# Patient Record
Sex: FEMALE | Race: WHITE | NOT HISPANIC OR LATINO | URBAN - METROPOLITAN AREA
[De-identification: names, ages, dates, MRNs, and addresses within clinical notes are randomized per-mention and may not be internally consistent; named-entity substitution may affect disease eponyms.]

---

## 2017-01-10 ENCOUNTER — IMPORTED ENCOUNTER (OUTPATIENT)
Dept: URBAN - METROPOLITAN AREA CLINIC 59 | Facility: CLINIC | Age: 82
End: 2017-01-10

## 2017-01-10 PROBLEM — D31.31 DRY EYE SYNDROME OF BILATERAL LACRIMAL GLANDS: Noted: 2017-01-10

## 2017-01-10 PROBLEM — H40.1131 PRIMARY OPEN-ANGLE GLAUCOMA, BILATERAL, MILD STAGE: Noted: 2017-01-10

## 2017-01-10 PROBLEM — H04.121 TEAR FILM INSUFFICIENCY OF RT LACRIMAL GLAND: Noted: 2017-01-10

## 2017-01-10 PROBLEM — H04.122 TEAR FILM INSUFFICIENCY OF LT LACRIMAL GLAND: Noted: 2017-01-10

## 2017-01-10 PROCEDURE — 99204 OFFICE O/P NEW MOD 45 MIN: CPT

## 2017-01-10 PROCEDURE — 76514 ECHO EXAM OF EYE THICKNESS: CPT

## 2017-01-10 PROCEDURE — 92250 FUNDUS PHOTOGRAPHY W/I&R: CPT

## 2017-05-05 ENCOUNTER — IMPORTED ENCOUNTER (OUTPATIENT)
Dept: URBAN - METROPOLITAN AREA CLINIC 59 | Facility: CLINIC | Age: 82
End: 2017-05-05

## 2017-05-05 PROBLEM — H04.122 TEAR FILM INSUFFICIENCY OF LT LACRIMAL GLAND: Noted: 2017-05-05

## 2017-05-05 PROBLEM — D31.31 DRY EYE SYNDROME OF BILATERAL LACRIMAL GLANDS: Noted: 2017-05-05

## 2017-05-05 PROBLEM — H04.121 TEAR FILM INSUFFICIENCY OF RT LACRIMAL GLAND: Noted: 2017-05-05

## 2017-05-05 PROBLEM — H40.1131 PRIMARY OPEN-ANGLE GLAUCOMA, BILATERAL, MILD STAGE: Noted: 2017-05-05

## 2017-05-05 PROCEDURE — 99214 OFFICE O/P EST MOD 30 MIN: CPT

## 2017-05-05 PROCEDURE — 92083 EXTENDED VISUAL FIELD XM: CPT

## 2017-05-05 PROCEDURE — 92133 CPTRZD OPH DX IMG PST SGM ON: CPT

## 2017-09-07 ENCOUNTER — IMPORTED ENCOUNTER (OUTPATIENT)
Dept: URBAN - METROPOLITAN AREA CLINIC 59 | Facility: CLINIC | Age: 82
End: 2017-09-07

## 2017-09-07 PROBLEM — D31.31 DRY EYE SYNDROME OF BILATERAL LACRIMAL GLANDS: Noted: 2017-09-07

## 2017-09-07 PROBLEM — H04.121 TEAR FILM INSUFFICIENCY OF RT LACRIMAL GLAND: Noted: 2017-09-07

## 2017-09-07 PROBLEM — H40.1131 PRIMARY OPEN-ANGLE GLAUCOMA, BILATERAL, MILD STAGE: Noted: 2017-09-07

## 2017-09-07 PROBLEM — H04.122 TEAR FILM INSUFFICIENCY OF LT LACRIMAL GLAND: Noted: 2017-09-07

## 2017-09-07 PROCEDURE — 99213 OFFICE O/P EST LOW 20 MIN: CPT

## 2017-12-20 ENCOUNTER — APPOINTMENT (RX ONLY)
Dept: URBAN - METROPOLITAN AREA CLINIC 348 | Facility: CLINIC | Age: 82
Setting detail: DERMATOLOGY
End: 2017-12-20

## 2017-12-20 DIAGNOSIS — Z85.828 PERSONAL HISTORY OF OTHER MALIGNANT NEOPLASM OF SKIN: ICD-10-CM | Status: RESOLVED

## 2017-12-20 DIAGNOSIS — L82.1 OTHER SEBORRHEIC KERATOSIS: ICD-10-CM

## 2017-12-20 DIAGNOSIS — L81.4 OTHER MELANIN HYPERPIGMENTATION: ICD-10-CM

## 2017-12-20 DIAGNOSIS — D22 MELANOCYTIC NEVI: ICD-10-CM

## 2017-12-20 DIAGNOSIS — D18.0 HEMANGIOMA: ICD-10-CM

## 2017-12-20 PROBLEM — D18.01 HEMANGIOMA OF SKIN AND SUBCUTANEOUS TISSUE: Status: ACTIVE | Noted: 2017-12-20

## 2017-12-20 PROBLEM — D22.5 MELANOCYTIC NEVI OF TRUNK: Status: ACTIVE | Noted: 2017-12-20

## 2017-12-20 PROCEDURE — 99213 OFFICE O/P EST LOW 20 MIN: CPT

## 2017-12-20 PROCEDURE — ? OTHER

## 2017-12-20 PROCEDURE — ? COUNSELING

## 2017-12-20 ASSESSMENT — LOCATION DETAILED DESCRIPTION DERM
LOCATION DETAILED: RIGHT SUPERIOR LATERAL UPPER BACK
LOCATION DETAILED: PERIUMBILICAL SKIN
LOCATION DETAILED: LEFT POSTERIOR NECK
LOCATION DETAILED: LEFT MID-UPPER BACK
LOCATION DETAILED: RIGHT LATERAL SUPERIOR CHEST
LOCATION DETAILED: RIGHT INFERIOR LATERAL MIDBACK
LOCATION DETAILED: LEFT MEDIAL BREAST 8-9:00 REGION
LOCATION DETAILED: EPIGASTRIC SKIN
LOCATION DETAILED: RIGHT CENTRAL EYEBROW
LOCATION DETAILED: LEFT MEDIAL UPPER BACK

## 2017-12-20 ASSESSMENT — LOCATION SIMPLE DESCRIPTION DERM
LOCATION SIMPLE: RIGHT EYEBROW
LOCATION SIMPLE: LEFT BREAST
LOCATION SIMPLE: LEFT UPPER BACK
LOCATION SIMPLE: ABDOMEN
LOCATION SIMPLE: CHEST
LOCATION SIMPLE: POSTERIOR NECK
LOCATION SIMPLE: RIGHT LOWER BACK
LOCATION SIMPLE: RIGHT BACK

## 2017-12-20 ASSESSMENT — LOCATION ZONE DERM
LOCATION ZONE: NECK
LOCATION ZONE: FACE
LOCATION ZONE: TRUNK

## 2017-12-20 NOTE — PROCEDURE: OTHER
Other (Free Text): SCC Right Central Eyebrow Tx: Moh's 4/6/2010\\nSCC Left Posterior Neck Tx: ED&C 4/26/2011
Detail Level: Detailed
Note Text (......Xxx Chief Complaint.): This diagnosis correlates with the

## 2018-03-16 ENCOUNTER — IMPORTED ENCOUNTER (OUTPATIENT)
Dept: URBAN - METROPOLITAN AREA CLINIC 59 | Facility: CLINIC | Age: 83
End: 2018-03-16

## 2018-03-16 PROBLEM — H04.122 TEAR FILM INSUFFICIENCY OF LT LACRIMAL GLAND: Noted: 2018-03-16

## 2018-03-16 PROBLEM — D31.31 DRY EYE SYNDROME OF BILATERAL LACRIMAL GLANDS: Noted: 2018-03-16

## 2018-03-16 PROBLEM — H04.121 TEAR FILM INSUFFICIENCY OF RT LACRIMAL GLAND: Noted: 2018-03-16

## 2018-03-16 PROBLEM — H40.1131 PRIMARY OPEN-ANGLE GLAUCOMA, BILATERAL, MILD STAGE: Noted: 2018-03-16

## 2018-03-16 PROCEDURE — 92015 DETERMINE REFRACTIVE STATE: CPT

## 2018-03-16 PROCEDURE — 92083 EXTENDED VISUAL FIELD XM: CPT

## 2018-03-16 PROCEDURE — 92012 INTRM OPH EXAM EST PATIENT: CPT

## 2018-09-07 ENCOUNTER — IMPORTED ENCOUNTER (OUTPATIENT)
Dept: URBAN - METROPOLITAN AREA CLINIC 59 | Facility: CLINIC | Age: 83
End: 2018-09-07

## 2018-09-07 PROBLEM — D31.31 DRY EYE SYNDROME OF BILATERAL LACRIMAL GLANDS: Noted: 2018-09-07

## 2018-09-07 PROBLEM — H40.1131 PRIMARY OPEN-ANGLE GLAUCOMA, BILATERAL, MILD STAGE: Noted: 2018-09-07

## 2018-09-07 PROBLEM — H04.121 TEAR FILM INSUFFICIENCY OF RT LACRIMAL GLAND: Noted: 2018-09-07

## 2018-09-07 PROBLEM — H04.122 TEAR FILM INSUFFICIENCY OF LT LACRIMAL GLAND: Noted: 2018-09-07

## 2018-09-07 PROCEDURE — 92133 CPTRZD OPH DX IMG PST SGM ON: CPT

## 2018-09-07 PROCEDURE — 99214 OFFICE O/P EST MOD 30 MIN: CPT

## 2019-03-08 ENCOUNTER — IMPORTED ENCOUNTER (OUTPATIENT)
Dept: URBAN - METROPOLITAN AREA CLINIC 59 | Facility: CLINIC | Age: 84
End: 2019-03-08

## 2019-03-08 PROBLEM — H04.121 TEAR FILM INSUFFICIENCY OF RT LACRIMAL GLAND: Noted: 2019-03-08

## 2019-03-08 PROBLEM — H04.122 TEAR FILM INSUFFICIENCY OF LT LACRIMAL GLAND: Noted: 2019-03-08

## 2019-03-08 PROBLEM — H40.1131 PRIMARY OPEN-ANGLE GLAUCOMA, BILATERAL, MILD STAGE: Noted: 2019-03-08

## 2019-03-08 PROBLEM — D31.31 DRY EYE SYNDROME OF BILATERAL LACRIMAL GLANDS: Noted: 2019-03-08

## 2019-03-08 PROCEDURE — 92083 EXTENDED VISUAL FIELD XM: CPT

## 2019-03-08 PROCEDURE — 92012 INTRM OPH EXAM EST PATIENT: CPT

## 2019-09-06 ENCOUNTER — IMPORTED ENCOUNTER (OUTPATIENT)
Dept: URBAN - METROPOLITAN AREA CLINIC 59 | Facility: CLINIC | Age: 84
End: 2019-09-06

## 2019-09-06 PROBLEM — H04.122 TEAR FILM INSUFFICIENCY OF LT LACRIMAL GLAND: Noted: 2019-09-06

## 2019-09-06 PROBLEM — H04.121 TEAR FILM INSUFFICIENCY OF RT LACRIMAL GLAND: Noted: 2019-09-06

## 2019-09-06 PROBLEM — H40.1131 PRIMARY OPEN-ANGLE GLAUCOMA, BILATERAL, MILD STAGE: Noted: 2019-09-06

## 2019-09-06 PROBLEM — D31.31 DRY EYE SYNDROME OF BILATERAL LACRIMAL GLANDS: Noted: 2019-09-06

## 2019-09-06 PROCEDURE — 92014 COMPRE OPH EXAM EST PT 1/>: CPT

## 2019-09-06 PROCEDURE — 92133 CPTRZD OPH DX IMG PST SGM ON: CPT

## 2020-08-21 ENCOUNTER — IMPORTED ENCOUNTER (OUTPATIENT)
Dept: URBAN - METROPOLITAN AREA CLINIC 59 | Facility: CLINIC | Age: 85
End: 2020-08-21

## 2020-08-21 PROBLEM — D31.31 DRY EYE SYNDROME OF BILATERAL LACRIMAL GLANDS: Noted: 2020-08-21

## 2020-08-21 PROBLEM — H04.122 TEAR FILM INSUFFICIENCY OF LT LACRIMAL GLAND: Noted: 2020-08-21

## 2020-08-21 PROBLEM — H40.1131 PRIMARY OPEN-ANGLE GLAUCOMA, BILATERAL, MILD STAGE: Noted: 2020-08-21

## 2020-08-21 PROBLEM — H04.121 TEAR FILM INSUFFICIENCY OF RT LACRIMAL GLAND: Noted: 2020-08-21

## 2020-08-21 PROCEDURE — 92133 CPTRZD OPH DX IMG PST SGM ON: CPT

## 2020-08-21 PROCEDURE — 92012 INTRM OPH EXAM EST PATIENT: CPT

## 2020-08-21 PROCEDURE — 92083 EXTENDED VISUAL FIELD XM: CPT

## 2020-09-25 ENCOUNTER — IMPORTED ENCOUNTER (OUTPATIENT)
Dept: URBAN - METROPOLITAN AREA CLINIC 59 | Facility: CLINIC | Age: 85
End: 2020-09-25

## 2020-09-25 PROBLEM — D31.31 DRY EYE SYNDROME OF BILATERAL LACRIMAL GLANDS: Noted: 2020-09-25

## 2020-09-25 PROBLEM — H04.121 TEAR FILM INSUFFICIENCY OF RT LACRIMAL GLAND: Noted: 2020-09-25

## 2020-09-25 PROBLEM — H40.1131 PRIMARY OPEN-ANGLE GLAUCOMA, BILATERAL, MILD STAGE: Noted: 2020-09-25

## 2020-09-25 PROBLEM — H04.122 TEAR FILM INSUFFICIENCY OF LT LACRIMAL GLAND: Noted: 2020-09-25

## 2020-09-25 PROCEDURE — 92012 INTRM OPH EXAM EST PATIENT: CPT

## 2021-01-22 ENCOUNTER — IMPORTED ENCOUNTER (OUTPATIENT)
Dept: URBAN - METROPOLITAN AREA CLINIC 59 | Facility: CLINIC | Age: 86
End: 2021-01-22

## 2021-01-22 PROBLEM — H04.122 TEAR FILM INSUFFICIENCY OF LT LACRIMAL GLAND: Noted: 2021-01-22

## 2021-01-22 PROBLEM — D31.31 DRY EYE SYNDROME OF BILATERAL LACRIMAL GLANDS: Noted: 2021-01-22

## 2021-01-22 PROBLEM — H04.121 TEAR FILM INSUFFICIENCY OF RT LACRIMAL GLAND: Noted: 2021-01-22

## 2021-01-22 PROBLEM — H40.1131 PRIMARY OPEN-ANGLE GLAUCOMA, BILATERAL, MILD STAGE: Noted: 2021-01-22

## 2021-01-22 PROCEDURE — 92014 COMPRE OPH EXAM EST PT 1/>: CPT

## 2021-01-22 PROCEDURE — 92250 FUNDUS PHOTOGRAPHY W/I&R: CPT

## 2021-07-23 ENCOUNTER — IMPORTED ENCOUNTER (OUTPATIENT)
Dept: URBAN - METROPOLITAN AREA CLINIC 59 | Facility: CLINIC | Age: 86
End: 2021-07-23

## 2021-07-23 PROBLEM — H04.121 TEAR FILM INSUFFICIENCY OF RT LACRIMAL GLAND: Noted: 2021-07-23

## 2021-07-23 PROBLEM — H04.122 TEAR FILM INSUFFICIENCY OF LT LACRIMAL GLAND: Noted: 2021-07-23

## 2021-07-23 PROBLEM — D31.31 DRY EYE SYNDROME OF BILATERAL LACRIMAL GLANDS: Noted: 2021-07-23

## 2021-07-23 PROBLEM — H40.1131 PRIMARY OPEN-ANGLE GLAUCOMA, BILATERAL, MILD STAGE: Noted: 2021-07-23

## 2021-07-23 PROCEDURE — 92133 CPTRZD OPH DX IMG PST SGM ON: CPT

## 2021-07-23 PROCEDURE — 92014 COMPRE OPH EXAM EST PT 1/>: CPT

## 2021-07-23 PROCEDURE — 92083 EXTENDED VISUAL FIELD XM: CPT

## 2021-12-17 ENCOUNTER — IMPORTED ENCOUNTER (OUTPATIENT)
Dept: URBAN - METROPOLITAN AREA CLINIC 59 | Facility: CLINIC | Age: 86
End: 2021-12-17

## 2021-12-17 PROBLEM — D31.31 DRY EYE SYNDROME OF BILATERAL LACRIMAL GLANDS: Noted: 2021-12-17

## 2021-12-17 PROBLEM — H04.121 TEAR FILM INSUFFICIENCY OF RT LACRIMAL GLAND: Noted: 2021-12-17

## 2021-12-17 PROBLEM — H04.122 TEAR FILM INSUFFICIENCY OF LT LACRIMAL GLAND: Noted: 2021-12-17

## 2021-12-17 PROBLEM — H40.1131 PRIMARY OPEN-ANGLE GLAUCOMA, BILATERAL, MILD STAGE: Noted: 2021-12-17

## 2021-12-17 PROCEDURE — 92250 FUNDUS PHOTOGRAPHY W/I&R: CPT

## 2021-12-17 PROCEDURE — 92014 COMPRE OPH EXAM EST PT 1/>: CPT

## 2022-07-01 ENCOUNTER — IMPORTED ENCOUNTER (OUTPATIENT)
Dept: URBAN - METROPOLITAN AREA CLINIC 59 | Facility: CLINIC | Age: 87
End: 2022-07-01

## 2022-07-01 PROBLEM — D31.31 CHOROIDAL NEVUS: Noted: 2022-07-01

## 2022-07-01 PROBLEM — H04.121 TEAR FILM INSUFFICIENCY OF RT LACRIMAL GLAND: Noted: 2022-07-01

## 2022-07-01 PROBLEM — H40.1131 POAG, MILD: Noted: 2022-07-01

## 2022-07-01 PROBLEM — H04.123 DRY EYE SYNDROME: Noted: 2022-07-01

## 2022-07-01 PROBLEM — H40.1131 PRIMARY OPEN-ANGLE GLAUCOMA, BILATERAL, MILD STAGE: Noted: 2022-07-01

## 2022-07-01 PROBLEM — H04.123 TEAR FILM INSUFFICIENCY OF RT LACRIMAL GLAND: Noted: 2022-07-01

## 2022-07-01 PROBLEM — D31.31 DRY EYE SYNDROME OF BILATERAL LACRIMAL GLANDS: Noted: 2022-07-01

## 2022-07-01 PROBLEM — D31.31 BENIGN NEOPLASM CHOROID/CHOROIDAL NEVUS: Noted: 2022-07-01

## 2022-07-01 PROBLEM — H04.122 TEAR FILM INSUFFICIENCY OF LT LACRIMAL GLAND: Noted: 2022-07-01

## 2022-07-01 PROBLEM — H04.123 TEAR FILM INSUFFICIENCY OF LT LACRIMAL GLAND: Noted: 2022-07-01

## 2022-07-01 PROCEDURE — 92014 COMPRE OPH EXAM EST PT 1/>: CPT

## 2022-07-01 PROCEDURE — 92083 EXTENDED VISUAL FIELD XM: CPT

## 2022-11-18 ENCOUNTER — FOLLOW UP (OUTPATIENT)
Dept: URBAN - METROPOLITAN AREA CLINIC 59 | Facility: CLINIC | Age: 87
End: 2022-11-18

## 2022-11-18 DIAGNOSIS — H04.123: ICD-10-CM

## 2022-11-18 DIAGNOSIS — H40.1131: ICD-10-CM

## 2022-11-18 DIAGNOSIS — D31.31: ICD-10-CM

## 2022-11-18 PROCEDURE — 92133 CPTRZD OPH DX IMG PST SGM ON: CPT

## 2022-11-18 PROCEDURE — 92020 GONIOSCOPY: CPT

## 2022-11-18 PROCEDURE — 99213 OFFICE O/P EST LOW 20 MIN: CPT

## 2022-11-18 ASSESSMENT — VISUAL ACUITY
OD_PH: 20/30
OS_SC: 20/30-2
OD_SC: 20/40+1

## 2022-11-18 ASSESSMENT — TONOMETRY
OD_IOP_MMHG: 15
OS_IOP_MMHG: 16

## 2023-05-09 ENCOUNTER — ESTABLISHED COMPREHENSIVE EXAM (OUTPATIENT)
Dept: URBAN - METROPOLITAN AREA CLINIC 59 | Facility: CLINIC | Age: 88
End: 2023-05-09

## 2023-05-09 DIAGNOSIS — H40.1131: ICD-10-CM

## 2023-05-09 DIAGNOSIS — H04.123: ICD-10-CM

## 2023-05-09 DIAGNOSIS — D31.31: ICD-10-CM

## 2023-05-09 PROCEDURE — 92014 COMPRE OPH EXAM EST PT 1/>: CPT

## 2023-05-09 PROCEDURE — 92133 CPTRZD OPH DX IMG PST SGM ON: CPT

## 2023-05-09 ASSESSMENT — VISUAL ACUITY
OS_SC: 20/40+2
OD_SC: 20/25
OS_PH: 20/30

## 2023-05-09 ASSESSMENT — TONOMETRY
OS_IOP_MMHG: 14
OD_IOP_MMHG: 14

## 2023-10-21 ASSESSMENT — VISUAL ACUITY
OD_SC: 20/20-2
OD_SC: 20/25+2
OS_SC: 20/25-1
OD_SC: 20/25-2
OD_SC: 20/40+2
OS_SC: 20/25
OS_SC: 20/25
OS_SC: 20/30+2
OS_SC: 20/40+1
OD_SC: 20/30+2
OD_SC: 20/40-1
OD_SC: 20/25-2
OS_SC: 20/25+2
OS_SC: 20/30
OS_SC: 20/25
OD_SC: 20/25-2
OD_SC: 20/30+2
OS_SC: 20/25-2
OD_SC: 20/25+1
OD_SC: 20/30-1
OS_SC: 20/25-2
OU_SC: 20/25
OS_SC: 20/30
OU_SC: 20/25
OS_SC: 20/30+2
OD_CC: 20/25
OS_SC: 20/30+2
OD_SC: 20/25-2
OD_SC: 20/25

## 2023-10-21 ASSESSMENT — TONOMETRY
OS_IOP_MMHG: 15
OD_IOP_MMHG: 19
OD_IOP_MMHG: 18
OS_IOP_MMHG: 13
OD_IOP_MMHG: 16
OS_IOP_MMHG: 17
OD_IOP_MMHG: 15
OS_IOP_MMHG: 14 - DILATED
OD_IOP_MMHG: 14
OS_IOP_MMHG: 15
OS_IOP_MMHG: 13
OD_IOP_MMHG: 16
OD_IOP_MMHG: 13
OD_IOP_MMHG: 13
OS_IOP_MMHG: 14
OS_IOP_MMHG: 15
OD_IOP_MMHG: 15
OS_IOP_MMHG: 14
OD_IOP_MMHG: 14
OS_IOP_MMHG: 20
OD_IOP_MMHG: 14 - DILATED
OS_IOP_MMHG: 15

## 2023-11-17 ENCOUNTER — FOLLOW UP (OUTPATIENT)
Dept: URBAN - METROPOLITAN AREA CLINIC 59 | Facility: CLINIC | Age: 88
End: 2023-11-17

## 2023-11-17 DIAGNOSIS — H04.123: ICD-10-CM

## 2023-11-17 DIAGNOSIS — H40.1131: ICD-10-CM

## 2023-11-17 DIAGNOSIS — D31.31: ICD-10-CM

## 2023-11-17 PROCEDURE — 92020 GONIOSCOPY: CPT

## 2023-11-17 PROCEDURE — 92083 EXTENDED VISUAL FIELD XM: CPT

## 2023-11-17 PROCEDURE — 92014 COMPRE OPH EXAM EST PT 1/>: CPT

## 2023-11-17 PROCEDURE — 92250 FUNDUS PHOTOGRAPHY W/I&R: CPT

## 2023-11-17 ASSESSMENT — TONOMETRY
OD_IOP_MMHG: 14
OS_IOP_MMHG: 13

## 2023-11-17 ASSESSMENT — VISUAL ACUITY
OS_SC: 20/30
OD_SC: 20/40
OD_PH: 20/25-2
OS_PH: 20/25-1

## 2025-07-24 ENCOUNTER — FOLLOW UP (OUTPATIENT)
Dept: URBAN - METROPOLITAN AREA CLINIC 59 | Facility: CLINIC | Age: OVER 89
End: 2025-07-24

## 2025-07-24 DIAGNOSIS — H40.1131: ICD-10-CM

## 2025-07-24 PROCEDURE — 92012 INTRM OPH EXAM EST PATIENT: CPT

## 2025-07-24 PROCEDURE — 92133 CPTRZD OPH DX IMG PST SGM ON: CPT

## 2025-07-24 ASSESSMENT — VISUAL ACUITY
OS_SC: 20/40-2
OU_SC: 20/20-1
OD_SC: 20/20-2
OS_PH: 20/30

## 2025-07-24 ASSESSMENT — TONOMETRY
OD_IOP_MMHG: 13
OS_IOP_MMHG: 13